# Patient Record
Sex: FEMALE | Race: OTHER | HISPANIC OR LATINO | URBAN - METROPOLITAN AREA
[De-identification: names, ages, dates, MRNs, and addresses within clinical notes are randomized per-mention and may not be internally consistent; named-entity substitution may affect disease eponyms.]

---

## 2021-04-14 ENCOUNTER — EMERGENCY (EMERGENCY)
Facility: HOSPITAL | Age: 46
LOS: 1 days | Discharge: HOME | End: 2021-04-14
Attending: EMERGENCY MEDICINE
Payer: COMMERCIAL

## 2021-04-14 VITALS
RESPIRATION RATE: 16 BRPM | HEIGHT: 62 IN | TEMPERATURE: 98 F | DIASTOLIC BLOOD PRESSURE: 70 MMHG | OXYGEN SATURATION: 100 % | SYSTOLIC BLOOD PRESSURE: 120 MMHG | WEIGHT: 130.07 LBS | HEART RATE: 62 BPM

## 2021-04-14 PROCEDURE — 99283 EMERGENCY DEPT VISIT LOW MDM: CPT

## 2021-04-14 RX ORDER — CIPROFLOXACIN HCL 0.3 %
2 DROPS OPHTHALMIC (EYE) ONCE
Refills: 0 | Status: COMPLETED | OUTPATIENT
Start: 2021-04-14 | End: 2021-04-14

## 2021-04-14 RX ADMIN — Medication 2 DROP(S): at 21:28

## 2021-04-14 NOTE — ED PROVIDER NOTE - EYE, RIGHT
subconjunctival hemorrhage to medial aspect, no hyphema subconjunctival hemorrhage to medial aspect, no hyphema, corneal abrasion to medial aspect

## 2021-04-14 NOTE — ED PROVIDER NOTE - NSFOLLOWUPCLINICS_GEN_ALL_ED_FT
[facility] - Ophthalmology  Ophthalmology  [address] [place], [facility] [address]  [place] [address]  Phone: [phone]  Fax:

## 2021-04-14 NOTE — ED PROVIDER NOTE - NSFOLLOWUPINSTRUCTIONS_ED_ALL_ED_FT
Corneal Abrasion    The cornea is the clear covering at the front and center of the eye. This very thin tissue is made up of many layers. If a scratch or injury causes the corneal epithelium to come off, it is called a corneal abrasion. Symptoms include eye pain, redness, tearing, difficulty keeping eye open, and light sensitivity. Do not drive or operate machinery if your eye is patched.  Antibiotic eye drops may be prescribed to reduce the risk of infection.  It is important to follow up with an ophthalmologist (eye doctor) to ensure proper healing.    SEEK IMMEDIATE MEDICAL CARE IF YOU HAVE ANY OF THE FOLLOWING SYMPTOMS: discharge from eyes, changes in vision, fever, or swelling.     ***Please see opthalmology tomorrow***    ***Please use cipro eye drops as follows: two drops in right eye, four times a day for 5 days unless told otherwise by ophthalmologist**

## 2021-04-14 NOTE — ED PROVIDER NOTE - PATIENT PORTAL LINK FT
You can access the FollowMyHealth Patient Portal offered by [facility] by registering at the following website: [email or web address]. By joining [employer]’s FollowMyHealth portal, you will also be able to view your health information using other applications (apps) compatible with our system.

## 2021-04-14 NOTE — ED ADULT NURSE NOTE - OBJECTIVE STATEMENT
Pt. c/o right eye pain s/p injury. Pt. stated she was scratching the corner of eye when her friend bumped into her hand and she poked herself in the eye.

## 2021-04-14 NOTE — ED ADULT NURSE NOTE - NSIMPLEMENTINTERV_GEN_ALL_ED
Implemented All Universal Safety Interventions:  [place] to call system. Call bell, personal items and telephone within reach. Instruct patient to call for assistance. Room bathroom lighting operational. Non-slip footwear when patient is off stretcher. Physically safe environment: no spills, clutter or unnecessary equipment. Stretcher in lowest position, wheels locked, appropriate side rails in place.

## 2021-04-14 NOTE — ED PROVIDER NOTE - ATTENDING CONTRIBUTION TO CARE
r eye vs finger  nasal aspect R eye w 3 mm uptake and near entire 2 ocloc to 5 o clock area w subconjunctival hemorrhage  no sidel  round pupil  refer to optho - abx gtt

## 2021-04-14 NOTE — ED PROVIDER NOTE - CLINICAL SUMMARY MEDICAL DECISION MAKING FREE TEXT BOX
47 y/o female presents with concern for corneal abrasion. Will assess eye with Fluorescin and if corneal abrasion present, will start abx and have pt f/u with opthalmology.

## 2021-04-15 ENCOUNTER — APPOINTMENT (OUTPATIENT)
Dept: OPHTHALMOLOGY | Facility: CLINIC | Age: 46
End: 2021-04-15
Payer: COMMERCIAL

## 2021-04-15 ENCOUNTER — NON-APPOINTMENT (OUTPATIENT)
Age: 46
End: 2021-04-15

## 2021-04-15 PROCEDURE — 92004 COMPRE OPH EXAM NEW PT 1/>: CPT

## 2021-04-15 PROCEDURE — 99072 ADDL SUPL MATRL&STAF TM PHE: CPT

## 2021-04-15 PROCEDURE — 92133 CPTRZD OPH DX IMG PST SGM ON: CPT

## 2021-04-21 PROBLEM — Z78.9 OTHER SPECIFIED HEALTH STATUS: Chronic | Status: ACTIVE | Noted: 2021-04-14

## 2021-05-06 ENCOUNTER — APPOINTMENT (OUTPATIENT)
Dept: OPHTHALMOLOGY | Facility: CLINIC | Age: 46
End: 2021-05-06

## 2022-04-12 NOTE — ED PROVIDER NOTE - OBJECTIVE STATEMENT
Dr Chilo Pastor    Patient asked to spk to Hardy Hansen regarding being seen sooner        # [phone] 45 y/o female with no significant pmhx or pshx, presents for R eye trauma. Pt reports at about 6pm today she was scratching her eye when her friend elbowed her, causing her nail to hit the eye. Pt states she now has some blurry vision when she blinks and the eye feels irritated, thus she came to the ED for evaluation. Pt states she sometime wears contact lenses, however was not wearing them today. Pt denies any other injuries.

## 2023-05-13 ENCOUNTER — EMERGENCY (EMERGENCY)
Facility: HOSPITAL | Age: 48
LOS: 1 days | Discharge: HOME | End: 2023-05-13
Attending: STUDENT IN AN ORGANIZED HEALTH CARE EDUCATION/TRAINING PROGRAM
Payer: COMMERCIAL

## 2023-05-13 VITALS
TEMPERATURE: 98 F | HEIGHT: 62 IN | OXYGEN SATURATION: 100 % | DIASTOLIC BLOOD PRESSURE: 86 MMHG | HEART RATE: 70 BPM | SYSTOLIC BLOOD PRESSURE: 138 MMHG | WEIGHT: 139.99 LBS | RESPIRATION RATE: 18 BRPM

## 2023-05-13 PROCEDURE — 99284 EMERGENCY DEPT VISIT MOD MDM: CPT

## 2023-05-13 PROCEDURE — 99283 EMERGENCY DEPT VISIT LOW MDM: CPT

## 2023-05-13 RX ORDER — OFLOXACIN 0.3 %
1 DROPS OPHTHALMIC (EYE) ONCE
Refills: 0 | Status: COMPLETED | OUTPATIENT
Start: 2023-05-13 | End: 2023-05-13

## 2023-05-13 RX ORDER — ERYTHROMYCIN BASE 5 MG/GRAM
1 OINTMENT (GRAM) OPHTHALMIC (EYE)
Qty: 1 | Refills: 0
Start: 2023-05-13 | End: 2023-05-19

## 2023-05-13 RX ORDER — FLUORESCEIN SODIUM 9 MG
2 STRIP OPHTHALMIC (EYE) ONCE
Refills: 0 | Status: COMPLETED | OUTPATIENT
Start: 2023-05-13 | End: 2023-05-13

## 2023-05-13 RX ADMIN — Medication 2 APPLICATION(S): at 22:47

## 2023-05-13 RX ADMIN — Medication 1 DROP(S): at 22:20

## 2023-05-13 RX ADMIN — Medication 1 DROP(S): at 22:48

## 2023-05-13 NOTE — ED ADULT NURSE NOTE - NSFALLUNIVINTERV_ED_ALL_ED
Bed/Stretcher in lowest position, wheels locked, appropriate side rails in place/Call bell, personal items and telephone in reach/Instruct patient to call for assistance before getting out of bed/chair/stretcher/Non-slip footwear applied when patient is off stretcher/[place] to call system/Physically safe environment - no spills, clutter or unnecessary equipment/Purposeful proactive rounding/Room/bathroom lighting operational, light cord in reach

## 2023-05-13 NOTE — ED PROVIDER NOTE - OBJECTIVE STATEMENT
48 y.o presenting with b/l eye pain and discomfort with blurry vision. endorses that it occurred after putting on contact lens with non-peroxide solution today. endorses that contact was take 48 y.o presenting with b/l eye pain and discomfort with blurry vision. endorses that it occurred after putting on contact lens with non-peroxide solution today. endorses that contact was taken out but still noting discomfort.

## 2023-05-13 NOTE — ED PROVIDER NOTE - NSFOLLOWUPINSTRUCTIONS_ED_ALL_ED_FT
Please apply 2 drop of ofloxacin drop provided to both eye every 6 hours for 4 days  Please contact Dr. Rodgers via [phone] for ophthalmology follow up  Return immediately if noting new or worsening symptoms.     Corneal Abrasion    A corneal abrasion is a scratch or injury to the clear covering over the front of the eye (cornea). Your cornea forms a clear dome that protects your eye and helps to focus your vision. Your cornea is made up of many layers, but the surface layer is one of the most sensitive tissues in your body. A corneal abrasion can be very painful.    If a corneal abrasion is not treated, it can become infected and cause an ulcer. This can lead to scarring. A scarred cornea can affect your vision. Sometimes abrasions come back in the same area, even after the original injury has healed.    What are the causes?  This condition may be caused by:  A poke in the eye.  A gritty or irritating substance (foreign body) in the eye.  Excessive eye rubbing.  Very dry eyes.  Certain eye infections.  Contact lenses that fit poorly or are worn for a long period of time. You can also injure your cornea when putting contact lenses in your eye or taking them out.  Eye surgery.  Certain cornea problems may increase the chance of a corneal abrasion.  Sometimes, the cause is not known.    What are the signs or symptoms?  Symptoms of this condition include:  Eye pain. The pain may get worse when you open and close your eye or when you move your eye.  A feeling of something stuck in your eye.  Tearing, redness, and sensitivity to light.  Having trouble keeping your eye open, or not being able to keep it open.  Blurred vision.  Headache.  How is this diagnosed?  You may work with a health care provider who specializes in diseases and conditions of the eye (ophthalmologist). This condition may be diagnosed based on your medical history, symptoms, and an eye exam.    Before the eye exam, numbing drops may be put into your eye. You may also have dye put in your eye with a dropper or a small paper strip. The dye makes the abrasion easy to see when your ophthalmologist examines your eye with a light. Your ophthalmologist may look at your eye through an eye scope (slit lamp).    How is this treated?  Treatment may vary depending on the cause of your condition, and it may include:  Washing out your eye.  Removing any foreign bodies that are in your eye.  Using antibiotic drops or ointment to treat or prevent an infection.  Using a dilating drop to decrease inflammation and pain.  Using steroid drops or ointment to treat redness, irritation, or inflammation.  Applying a cold, wet cloth (cold compress) or ice pack to ease the pain.  Taking pain medicine by mouth (orally).  In some cases, an eye patch or bandage soft contact lens might also be used. An eye patch should not be used if the corneal abrasion was related to contact lens wear as it can increase the chance of infection in these eyes.    Follow these instructions at home:  Medicines    Use eye drops or ointments as told by your health care provider.  If you were prescribed antibiotic drops or ointment, use them as told by your health care provider. Do not stop using the antibiotic even if you start to feel better.  Take over-the-counter and prescription medicines only as told by your health care provider.  Ask your health care provider if the medicine prescribed to you:  Requires you to avoid driving or using heavy machinery.  Can cause constipation. You may need to take these actions to prevent or treat constipation:  Drink enough fluid to keep your urine pale yellow.  Take over-the-counter or prescription medicines.  Eat foods that are high in fiber, such as beans, whole grains, and fresh fruits and vegetables.  Limit foods that are high in fat and processed sugars, such as fried or sweet foods.  Eye patch use    If you have an eye patch, wear it as told by your health care provider.  Do not drive or use machinery while wearing an eye patch. Your ability to  distances will be impaired.  Follow instructions from your health care provider about when to remove the patch.  General instructions    Ask your health care provider whether you can use a cold compress on your eye to relieve pain.  Do not rub or touch your eye. Do not wash out your eye.  Do not wear contact lenses until your health care provider says that this is okay.  Avoid bright light and eye strain.  Keep all follow-up visits as told by your health care provider. This is important for preventing infection and vision loss.  Contact a health care provider if:  You continue to have eye pain and other symptoms for more than 2 days.  You have new symptoms, such as worse redness, tearing, or discharge.  You have discharge that makes your eyelids stick together in the morning.  Your eye patch becomes so loose that you can blink your eye.  Symptoms return after the original abrasion has healed.  Get help right away if:  You have severe eye pain that does not get better with medicine.  You have vision loss.  Summary  A corneal abrasion is a scratch or injury to the clear covering over the front of the eye (cornea).  It is important to get treatment for a corneal abrasion. If this problem is not treated, it can affect your vision.  Use eye drops or ointments as told by your health care provider.  If you have an eye patch, do not drive or use machinery while wearing it. Your ability to  distances will be impaired.  Let your health care provider know if your symptoms continue for more than 2 days.  This information is not intended to replace advice given to you by your health care provider. Make sure you discuss any questions you have with your health care provider.

## 2023-05-13 NOTE — ED PROVIDER NOTE - PATIENT PORTAL LINK FT
You can access the FollowMyHealth Patient Portal offered by [facility] by registering at the following website: [email or web address]. By joining [employer]’s FollowMyHealth portal, you will also be able to view your health information using other applications (apps) compatible with our system.

## 2023-05-13 NOTE — ED PROVIDER NOTE - PHYSICAL EXAMINATION
b/l corneal abrasion noted  negative sidel sign  bl eye 20/70  multiple abrasin noted to left corneal  singular abrasion noted to right corneal    b/l pressure approx 15

## 2023-05-13 NOTE — ED ADULT TRIAGE NOTE - CHIEF COMPLAINT QUOTE
Pt stated at 5pm today she put contacts in both her eyes and they started burning then it stopped and the burning sensation come back around 7pm -7:30pm and she took out the contacts at 8pm. Pt c/o burning sensation, very painful and blurry vision when she opens her eyes

## 2023-05-13 NOTE — ED ADULT NURSE NOTE - OBJECTIVE STATEMENT
As per pt. c/o burning eye sensation s/p contact lens insertion. Pt. states that she put her contact lenses in her eyes after not using them for months. After a couple hours of burning, runny eyes, pain and itching, lenses were removed but symptoms persists. Denies all other symptoms

## 2023-05-13 NOTE — ED PROVIDER NOTE - NS ED ROS FT
Patient was not evaluated in the clinic, swab was collected due to pre-op testing requirement to screen for COVID-19 using [employer] testing. eye pain bl  blurred vision bl

## 2023-05-14 ENCOUNTER — TRANSCRIPTION ENCOUNTER (OUTPATIENT)
Age: 48
End: 2023-05-14
